# Patient Record
Sex: MALE | Race: WHITE | NOT HISPANIC OR LATINO | Employment: UNEMPLOYED | URBAN - METROPOLITAN AREA
[De-identification: names, ages, dates, MRNs, and addresses within clinical notes are randomized per-mention and may not be internally consistent; named-entity substitution may affect disease eponyms.]

---

## 2022-05-26 ENCOUNTER — TELEPHONE (OUTPATIENT)
Dept: SURGERY | Facility: CLINIC | Age: 27
End: 2022-05-26

## 2022-05-26 NOTE — TELEPHONE ENCOUNTER
[facility]:    JAREN KEENE  [phone]  1995    *Patient was scheduled for a consult visit with [facility] 5/12-NO SHOW.  * will hold referral for a few days in case he calls to reschedule Possible sports hernia NP Jenifer Melendez    VM 5/24/22 to call and schedule cag  VM 5/25/22 to call and schedule cag  VM 5/26/22 cag.....ok to send to hims...Thanks!